# Patient Record
Sex: FEMALE | Race: WHITE | URBAN - METROPOLITAN AREA
[De-identification: names, ages, dates, MRNs, and addresses within clinical notes are randomized per-mention and may not be internally consistent; named-entity substitution may affect disease eponyms.]

---

## 2017-02-05 ENCOUNTER — VIRTUAL VISIT (OUTPATIENT)
Dept: FAMILY MEDICINE | Facility: OTHER | Age: 52
End: 2017-02-05

## 2017-02-05 NOTE — PROGRESS NOTES
"Date:   Clinician: Reji Delgado  Clinician NPI: [id number]  Patient: Ellen Keenan  Patient : 1965  Patient Address: [address] [place] [address]  Patient Phone: [phone]  Visit Protocol: URI  Patient Summary:  Ellen is a 51 year old ( : 1965 ) female who initiated a Zip for a presumed sinus infection. When asked the question \"Do you have a [place] primary care physician?\", Ellen responded \"No\".     Her  symptoms started gradually 10-13 days ago  and consist of ear pain, nasal congestion, post-nasal drainage, myalgias, malaise, and fever.   She denies dysphagia, nausea, vomiting, itchy eyes, chills, sore throat, hoarse voice, dyspnea, rhinitis, cough, chest pain, and loss of appetite. She denies a history of facial surgery.   Her minimal nasal secretions are green. Her moderate facial pain or pressure feels worse when bending over or leaning forward and is located on both sides of her head. The facial pain or pressure started after the onset of other URI symptoms.  She has teeth pain and is confident the tooth pain is not from a cavity, recent dental work or other mouth problems. Ellen has a moderate headache. The headache did not start before her other symptoms and is located on both sides of her head.   In the past year Ellen has been diagnosed with one (1) episodes of sinusitis. When asked to feel her neck she denied feeling enlarged lymph nodes. She denies axillary lymphadenopathy.   Regarding the ear pain, the patient denies mastoid tenderness, recent injury to the area around the ear, tinnitus, experiencing pain when gently pulling on the earlobe, and pain if the mouth is fully open or teeth are clenched.   She reports having mild ear pain on the ear canal area of both ears for 1 day. The patient hears normally despite the ear pain.   In addition to the ear pain, Ellen also reports having the following symptoms:      Tenderness located on her ear lobe     A " feeling of fullness in the ear as if it is clogged     Ellen denies having redness and swelling on her ear.   Additionally, she does not experience pain when bending the chin to the chest.   She has never had tympanostomy tube placement.    She has passed urine in the past 12 hours.   Ellen denies having COPD or other chronic lung disease.   Pulse: self-reported pulse rate as: 10 beats in 10 seconds.    Weight (in lbs): 125   She states she is not pregnant and denies breastfeeding. She no longer menstruates.   Ellen does not smoke or use smokeless tobacco.  MEDICATIONS:  Phenylephrine (Sudafed PE), acetaminophen (Tylenol), oxymetazoline (Afrin, Dristan), and ibuprofen (Advil, Motrin)   Patient free text response:  Amiriptyline  , ALLERGIES:   penicillin/amoxicillin/augmentin and sulfa (Bactrim/Septra)    Clinician Response:  Dear Ellen,  Based on the information you have provided, you likely have  acute sinusitis, otherwise known as a sinus infection.   I am prescribing Doxycycline Hyclate [Vibramycin] 100 mg. Take one tablet by mouth two times a day for 7 days. There are no refills with this prescription.   Drink plenty of liquids, especially water and take time to rest your body. This may mean taking a nap or going to bed earlier. Your body is fighting an infection and liquids and rest will improve the pace of recovery. Remember to regularly wash your hands and avoid close contact with others to prevent spreading your infection.   Some people develop allergies to antibiotics. If you notice a new rash, significant swelling or difficulty breathing, stop the medication immediately and go into a clinic for physical evaluation.   Some women may also develop a yeast infection as a side effect of taking antibiotics. If you notice symptoms of a yeast infection, please use Zipnosis to get treatment.   If you become pregnant during this course of treatment with medication, stop taking the medication and contact your primary  care clinician.   Diagnosis: Acute Sinusitis  Diagnosis ICD: J01.90  Prescription: doxycycline Hyclate (Vibramycin) 100mg oral tablet 14 tablets, 7 days supply. Take one tablet by mouth two times a day for 7 days. Refills: 0, Refill as needed: no, Allow substitutions: yes  Prescription Sent At: February 05 11:26:53, 2017  Pharmacy: [id number] [place] - [phone] - [address], [place] [address]

## 2024-03-12 ENCOUNTER — TRANSFERRED RECORDS (OUTPATIENT)
Dept: HEALTH INFORMATION MANAGEMENT | Facility: CLINIC | Age: 59
End: 2024-03-12

## 2025-02-11 ENCOUNTER — TELEPHONE (OUTPATIENT)
Dept: FAMILY MEDICINE | Facility: OTHER | Age: 60
End: 2025-02-11

## 2025-02-11 DIAGNOSIS — Z12.11 ENCOUNTER FOR SCREENING COLONOSCOPY: Primary | ICD-10-CM

## 2025-02-11 NOTE — TELEPHONE ENCOUNTER
Screening Questions for the Scheduling of Screening Colonoscopies     (If Colonoscopy is diagnostic, Provider should review the chart before scheduling.)    Are you younger than 50 or older than 80?  No    Do you take aspirin or fish oil?  No (if yes, tell patient to stop 1 week prior to Colonoscopy)    Do you take warfarin (Coumadin), clopidogrel (Plavix), apixaban (Eliquis), dabigatram (Pradaxa), rivaroxaban (Xarelto) or any blood thinner? No    Do you use oxygen at home?  No    Do you have kidney disease? No    Are you on dialysis? No    Have you had a stroke or heart attack in the last year? No    Have you had a stent in your heart or any blood vessel in the last year? No    Have you had a transplant of any organ?  No    Have you had a colonoscopy or upper endoscopy (EGD) before?  No         Date of scheduled Colonoscopy: 3/7/25     Provider: Dr. Peralta     [facility]

## 2025-02-11 NOTE — LETTER
February 12, 2025      Ellen Keenan  [address] [place] [address]        Dear Ellen,       Guide to Your Colonoscopy or Upper GI Endoscopy  Date of Procedure 3/7/25  Dr. Peralta    Pre-Admission Phone Interview  Date & Time: 2/28/25 at 2:30PM.    Prep Instructions for MiraLAX (No Mag Citrate) have been included.   The medications for your prep can be picked up at your preferred pharmacy.     Please schedule a pre op with your primary care provider, IVAN Raymond.     At [facility], we want to make sure that your endoscopy   is as pleasant as possible. This guide is designed to answer   questions you might have and to walk you through what   you will need to do to prepare for your procedure.  Contact us if you need to cancel your procedure or have any   additional questions.  Day Surgery Reception [phone] Open M-F, 7:00 AM-5:00 PM  After Hours [phone], Ask for House Supervisor  For Cancellations - Appointments [phone]      Sincerely,        Physician No Ref-Primary    Electronically signed

## 2025-02-12 RX ORDER — MULTIVITAMIN
1 TABLET ORAL DAILY
COMMUNITY

## 2025-02-12 RX ORDER — RIZATRIPTAN BENZOATE 10 MG/1
10 TABLET ORAL
COMMUNITY

## 2025-02-12 RX ORDER — BISACODYL 5 MG/1
TABLET, DELAYED RELEASE ORAL
Qty: 4 TABLET | Refills: 0 | Status: SHIPPED | OUTPATIENT
Start: 2025-02-12

## 2025-02-12 RX ORDER — FLUTICASONE PROPIONATE 50 MCG
1 SPRAY, SUSPENSION (ML) NASAL DAILY PRN
COMMUNITY

## 2025-02-12 RX ORDER — POLYETHYLENE GLYCOL 3350 17 G/17G
POWDER, FOR SOLUTION ORAL
Qty: 238 G | Refills: 0 | Status: SHIPPED | OUTPATIENT
Start: 2025-02-12

## 2025-02-12 RX ORDER — AZELASTINE HYDROCHLORIDE, FLUTICASONE PROPIONATE 137; 50 UG/1; UG/1
137 SPRAY, METERED NASAL 2 TIMES DAILY
COMMUNITY

## 2025-02-12 RX ORDER — FAMOTIDINE 20 MG
2000 TABLET ORAL DAILY
COMMUNITY

## 2025-02-12 NOTE — TELEPHONE ENCOUNTER
Patient scheduled for screening colonoscopy on 3/7/25  with Dr. Peralta.  Spoke with pt twice this AM.  I was able to pull medical records from [facility] and found an old H&P from 2024 from [facility].  Patient is going to schedule a pre op with JUNIOR Raymond and is aware to  Miralax and Dulcolax at a pharmacy.  Guide to your Colonoscopy or Upper GI Endoscopy and prep instructions sent to patient via US Mail.

## 2025-02-19 ENCOUNTER — TRANSFERRED RECORDS (OUTPATIENT)
Dept: HEALTH INFORMATION MANAGEMENT | Facility: CLINIC | Age: 60
End: 2025-02-19

## 2025-02-21 ENCOUNTER — HOSPITAL ENCOUNTER (OUTPATIENT)
Facility: HOSPITAL | Age: 60
Discharge: HOME | End: 2025-02-21
Attending: SURGERY | Admitting: SURGERY
Payer: COMMERCIAL

## 2025-02-21 VITALS
RESPIRATION RATE: 16 BRPM | OXYGEN SATURATION: 100 % | DIASTOLIC BLOOD PRESSURE: 81 MMHG | HEIGHT: 65 IN | HEART RATE: 54 BPM | TEMPERATURE: 96.9 F | SYSTOLIC BLOOD PRESSURE: 127 MMHG | WEIGHT: 125 LBS | BODY MASS INDEX: 20.83 KG/M2

## 2025-02-21 PROCEDURE — 258N000003 HC RX IP 258 OP 636: Performed by: NURSE ANESTHETIST, CERTIFIED REGISTERED

## 2025-02-21 PROCEDURE — 370N000017 HC ANESTHESIA TECHNICAL FEE, PER MIN: Performed by: SURGERY

## 2025-02-21 PROCEDURE — G0121 COLON CA SCRN NOT HI RSK IND: HCPCS | Performed by: SURGERY

## 2025-02-21 PROCEDURE — 999N000141 HC STATISTIC PRE-PROCEDURE NURSING ASSESSMENT: Performed by: SURGERY

## 2025-02-21 PROCEDURE — 272N000001 HC OR GENERAL SUPPLY STERILE: Performed by: SURGERY

## 2025-02-21 PROCEDURE — 710N000012 HC RECOVERY PHASE 2, PER MINUTE: Performed by: SURGERY

## 2025-02-21 PROCEDURE — 360N000075 HC SURGERY LEVEL 2, PER MIN: Performed by: SURGERY

## 2025-02-21 RX ORDER — ONDANSETRON 2 MG/ML
4 INJECTION INTRAMUSCULAR; INTRAVENOUS EVERY 30 MIN PRN
Status: DISCONTINUED | OUTPATIENT
Start: 2025-02-21 | End: 2025-02-21 | Stop reason: HOSPADM

## 2025-02-21 RX ORDER — FLUMAZENIL 0.1 MG/ML
0.2 INJECTION, SOLUTION INTRAVENOUS
Status: CANCELLED | OUTPATIENT
Start: 2025-02-21 | End: 2025-02-21

## 2025-02-21 RX ORDER — DEXAMETHASONE SODIUM PHOSPHATE 10 MG/ML
4 INJECTION, SOLUTION INTRAMUSCULAR; INTRAVENOUS
Status: DISCONTINUED | OUTPATIENT
Start: 2025-02-21 | End: 2025-02-21 | Stop reason: HOSPADM

## 2025-02-21 RX ORDER — NALOXONE HYDROCHLORIDE 0.4 MG/ML
0.1 INJECTION, SOLUTION INTRAMUSCULAR; INTRAVENOUS; SUBCUTANEOUS
Status: DISCONTINUED | OUTPATIENT
Start: 2025-02-21 | End: 2025-02-21 | Stop reason: HOSPADM

## 2025-02-21 RX ORDER — ONDANSETRON 4 MG/1
4 TABLET, ORALLY DISINTEGRATING ORAL EVERY 30 MIN PRN
Status: DISCONTINUED | OUTPATIENT
Start: 2025-02-21 | End: 2025-02-21 | Stop reason: HOSPADM

## 2025-02-21 RX ORDER — SODIUM CHLORIDE, SODIUM LACTATE, POTASSIUM CHLORIDE, CALCIUM CHLORIDE 600; 310; 30; 20 MG/100ML; MG/100ML; MG/100ML; MG/100ML
INJECTION, SOLUTION INTRAVENOUS CONTINUOUS
Status: DISCONTINUED | OUTPATIENT
Start: 2025-02-21 | End: 2025-02-21 | Stop reason: HOSPADM

## 2025-02-21 RX ORDER — LIDOCAINE 40 MG/G
CREAM TOPICAL
Status: DISCONTINUED | OUTPATIENT
Start: 2025-02-21 | End: 2025-02-21 | Stop reason: HOSPADM

## 2025-02-21 RX ADMIN — SODIUM CHLORIDE, SODIUM LACTATE, POTASSIUM CHLORIDE, AND CALCIUM CHLORIDE: .6; .31; .03; .02 INJECTION, SOLUTION INTRAVENOUS at 06:58

## 2025-02-21 ASSESSMENT — ACTIVITIES OF DAILY LIVING (ADL)
ADLS_ACUITY_SCORE: 41
ADLS_ACUITY_SCORE: 41

## 2025-02-21 NOTE — OP NOTE
Ellen Keenan MRN# [id number]   YOB: 1965 Age: 59 year old      Date of Admission:  2/21/2025  Date of Service:   2/21/25    Primary care provider: Anh Crenshaw    PREOPERATIVE DIAGNOSIS:  Colon Cancer Screening        POSTOPERATIVE DIAGNOSIS:  Normal colon          PROCEDURE:  Colonoscopy           INDICATIONS:  Screening colonoscopy.      Specimen: * No specimens in log *    SURGEON: Richi Peralta MD    DESCRIPTION OF PROCEDURE: Ellen Keenan was brought into the endoscopy suite and placed in the left lateral decubitus position. After preprocedural pause and attended monitored anesthesia was administered, the external anus was inspected and was noted to have hemorrhoidal skin tags. Digital rectal exam was normal. The colonoscope was inserted and advanced under direct visualization to the level of the cecum which was identified by the appendiceal orifice and the ileocecal valve. The prep was excellent.. Upon slow withdrawal of the colonoscope, approximately 95% of the mucosa was directly visualized. The  colon was without mucosal abnormality. There was no evidence of  polyps, inflammation, bleeding or AVMs. Retroflexion of the rectum was noted to have hypertrophied hemorrhoid piles. The extra air was removed from the colon, and the colonoscope withdrawn. The patient tolerated the procedure well and was taken to postanesthesia care unit.     We invite the patient to return in 10 years for follow up screening evaluation.    Richi Peralta MD

## 2025-02-21 NOTE — DISCHARGE INSTRUCTIONS

## 2025-02-21 NOTE — OR NURSING
Patient and responsible adult given discharge instructions with no questions regarding instructions. John score 20/20. Pain level 0/10.  Discharged from unit via walking. Patient discharged to home with .   food stamps (SNAP)/welfare

## 2025-02-22 ENCOUNTER — NURSE TRIAGE (OUTPATIENT)
Dept: NURSING | Facility: CLINIC | Age: 60
End: 2025-02-22

## 2025-02-22 NOTE — TELEPHONE ENCOUNTER
Pt calling that she had a colonoscopy yesterday 2/21/25 and last night developed pink tinged mucous.  Hx hemorrhoid.  Pt will monitor and follow up with Provider/Clinic if needed.  Cris Allen RN  [facility] Nurse Advisor    Reason for Disposition   Rectal bleeding (slight; streaks or less than 1 teaspoon or 5 ml) after colonoscopy, questions about    Additional Information   Negative: Shock suspected (e.g., cold/pale/clammy skin, too weak to stand, low BP, rapid pulse)   Negative: Difficult to awaken or acting confused (e.g., disoriented, slurred speech)   Negative: Passed out (i.e., lost consciousness, collapsed and was not responding)   Negative: Sounds like a life-threatening emergency to the triager   Negative: Breathing difficulty   Negative: Chest pain   Negative: [1] Abdomen pain is main concern AND [2] started > 3 days (72 hours) after colonoscopy AND [3] Female   Negative: [1] Abdomen pain is main concern AND [2] started > 3 days (72 hours) after colonoscopy AND [3] male   Negative: [1] Vomiting is main concern AND [2] started > 3 days after colonoscopy   Negative: SEVERE abdomen pain (e.g., excruciating)   Negative: SEVERE rectal bleeding (large blood clots; on and off, or constant bleeding)   Negative: SEVERE dizziness (e.g., unable to stand, requires support to walk, feels like passing out now)   Negative: SEVERE vomiting (e.g., 6 or more times/day)   Negative: [1] MODERATE rectal bleeding (small blood clots, passing blood without stool, or toilet water turns red) AND [2] more than once   Negative: [1] MILD-MODERATE abdomen pain AND [2] constant AND [3] present > 2 hours   Negative: [1] Drinking very little AND [2] dehydration suspected (e.g., no urine > 12 hours, very dry mouth, very lightheaded)   Negative: Patient sounds very sick or weak to the triager   Negative: Fever > 100.4 F (38.0 C)   Negative: [1] Abdominal bloating, cramping, nausea, or vomiting while drinking bowel prep AND [2] CANNOT finish  bowel prep AND [3] has tried recommended Care Advice   Negative: [1] Caller has URGENT question or concern AND [2] triager unable to answer question   Negative: [1] MILD-MODERATE abdomen pain (e.g., does not interfere with normal activities) AND [2] pain comes and goes (cramps) [3] lasting > 48 hours   Negative: [1] MILD to MODERATE vomiting (e.g., 1 to 5 times a day) AND [2] lasting > 24 hours   Negative: Any rectal bleeding occurring > 24 hours after colonoscopy   Negative: [1] Caller has NON-URGENT question AND [2] triager unable to answer question   Negative: Abdominal cramping and bloating after colonoscopy, questions about    Protocols used: Colonoscopy Symptoms and [id number]

## 2025-03-09 ENCOUNTER — HEALTH MAINTENANCE LETTER (OUTPATIENT)
Age: 60
End: 2025-03-09

## (undated) DEVICE — SUCTION MANIFOLD NEPTUNE 2 SYS 1 PORT 702-025-000

## (undated) DEVICE — SOL WATER IRRIG 1000ML BOTTLE 2F7114

## (undated) DEVICE — CONNECTOR ERBEFLO 2 PORT 20325-215

## (undated) DEVICE — TUBING SUCTION 20FT N620A

## (undated) RX ORDER — PROPOFOL 10 MG/ML
INJECTION, EMULSION INTRAVENOUS
Status: DISPENSED
Start: 2025-02-21

## (undated) RX ORDER — GLYCOPYRROLATE 0.2 MG/ML
INJECTION, SOLUTION INTRAMUSCULAR; INTRAVENOUS
Status: DISPENSED
Start: 2025-02-21